# Patient Record
Sex: FEMALE | Race: WHITE | URBAN - METROPOLITAN AREA
[De-identification: names, ages, dates, MRNs, and addresses within clinical notes are randomized per-mention and may not be internally consistent; named-entity substitution may affect disease eponyms.]

---

## 2021-07-07 ENCOUNTER — TELEPHONE ENCOUNTER (OUTPATIENT)
Dept: URBAN - METROPOLITAN AREA SURGERY CENTER 30 | Facility: SURGERY CENTER | Age: 66
End: 2021-07-07

## 2021-08-09 ENCOUNTER — OFFICE VISIT (OUTPATIENT)
Dept: URBAN - METROPOLITAN AREA CLINIC 96 | Facility: CLINIC | Age: 66
End: 2021-08-09
Payer: MEDICARE

## 2021-08-09 ENCOUNTER — WEB ENCOUNTER (OUTPATIENT)
Dept: URBAN - METROPOLITAN AREA CLINIC 96 | Facility: CLINIC | Age: 66
End: 2021-08-09

## 2021-08-09 VITALS
HEART RATE: 63 BPM | SYSTOLIC BLOOD PRESSURE: 142 MMHG | BODY MASS INDEX: 24.83 KG/M2 | TEMPERATURE: 97.9 F | DIASTOLIC BLOOD PRESSURE: 92 MMHG | WEIGHT: 149 LBS | HEIGHT: 65 IN

## 2021-08-09 DIAGNOSIS — Z86.010 PERSONAL HISTORY OF COLONIC POLYPS: ICD-10-CM

## 2021-08-09 DIAGNOSIS — Z85.3 PERSONAL HISTORY OF BREAST CANCER: ICD-10-CM

## 2021-08-09 DIAGNOSIS — D12.6 TUBULAR ADENOMA OF COLON: ICD-10-CM

## 2021-08-09 DIAGNOSIS — Z85.42 PERSONAL HISTORY OF MALIGNANT NEOPLASM OF UTERUS: ICD-10-CM

## 2021-08-09 DIAGNOSIS — Z13.71 BRCA GENE MUTATION NEGATIVE IN FEMALE: ICD-10-CM

## 2021-08-09 DIAGNOSIS — K76.0 FATTY LIVER: ICD-10-CM

## 2021-08-09 DIAGNOSIS — K59.00 CONSTIPATION, UNSPECIFIED CONSTIPATION TYPE: ICD-10-CM

## 2021-08-09 DIAGNOSIS — K80.20 CALCULUS OF GALLBLADDER WITHOUT CHOLECYSTITIS WITHOUT OBSTRUCTION: ICD-10-CM

## 2021-08-09 DIAGNOSIS — K21.9 GASTROESOPHAGEAL REFLUX DISEASE, UNSPECIFIED WHETHER ESOPHAGITIS PRESENT: ICD-10-CM

## 2021-08-09 DIAGNOSIS — R14.0 BLOATING: ICD-10-CM

## 2021-08-09 PROBLEM — 444898006: Status: ACTIVE | Noted: 2021-08-09

## 2021-08-09 PROBLEM — 428283002: Status: ACTIVE | Noted: 2021-08-09

## 2021-08-09 PROBLEM — 70342003: Status: ACTIVE | Noted: 2021-08-09

## 2021-08-09 PROBLEM — 197321007: Status: ACTIVE | Noted: 2021-08-09

## 2021-08-09 PROBLEM — 428941002: Status: ACTIVE | Noted: 2021-08-09

## 2021-08-09 PROBLEM — 429087003: Status: ACTIVE | Noted: 2021-08-09

## 2021-08-09 PROBLEM — 14760008: Status: ACTIVE | Noted: 2021-08-09

## 2021-08-09 PROCEDURE — 99214 OFFICE O/P EST MOD 30 MIN: CPT | Performed by: INTERNAL MEDICINE

## 2021-08-09 RX ORDER — POLYETHYLENE GLYCOL 3350, SODIUM SULFATE, SODIUM CHLORIDE, POTASSIUM CHLORIDE, ASCORBIC ACID, SODIUM ASCORBATE 140-9-5.2G
AS DIRECTED KIT ORAL
Qty: 1 BOX | Refills: 1 | OUTPATIENT

## 2021-08-09 RX ORDER — DICYCLOMINE HYDROCHLORIDE 10 MG/1
TAKE 1 CAPSULE (10 MG) BY ORAL ROUTE 3 TIMES PER DAY AS NEEDED FOR ABDOMINAL DISCOMFORT AS NEEDED CAPSULE ORAL
Qty: 180 | Refills: 1 | Status: DISCONTINUED | COMMUNITY
Start: 2020-04-06

## 2021-08-09 NOTE — HPI-TODAY'S VISIT:
66 y.o. WF Make me 15 yrs younger Wants a full body transplant IBS is the same Was doing Dicyclomine 2-3 times / day - no difference on it vs off it Ran out of Pantoprazole - tried to live w/o it - but, started to get bad heartburn - w/ refilling it, heartburn fine On Letrozole - causing constipation Constipation is biggest issue Has occ bad bloating Has lost 10 pounds - now stalled - wants another 10 Bowels ok w/ Miralax and occ Colace Does a lot of dry fruit - yay to prunes

## 2021-10-01 ENCOUNTER — WEB ENCOUNTER (OUTPATIENT)
Dept: URBAN - METROPOLITAN AREA CLINIC 96 | Facility: CLINIC | Age: 66
End: 2021-10-01

## 2021-10-06 ENCOUNTER — OFFICE VISIT (OUTPATIENT)
Dept: URBAN - METROPOLITAN AREA SURGERY CENTER 18 | Facility: SURGERY CENTER | Age: 66
End: 2021-10-06
Payer: MEDICARE

## 2021-10-06 ENCOUNTER — CLAIMS CREATED FROM THE CLAIM WINDOW (OUTPATIENT)
Dept: URBAN - METROPOLITAN AREA CLINIC 4 | Facility: CLINIC | Age: 66
End: 2021-10-06
Payer: MEDICARE

## 2021-10-06 DIAGNOSIS — Z86.010 ADENOMAS PERSONAL HISTORY OF COLONIC POLYPS: ICD-10-CM

## 2021-10-06 DIAGNOSIS — D12.3 BENIGN NEOPLASM OF TRANSVERSE COLON: ICD-10-CM

## 2021-10-06 DIAGNOSIS — D12.3 ADENOMA OF TRANSVERSE COLON: ICD-10-CM

## 2021-10-06 PROCEDURE — 45380 COLONOSCOPY AND BIOPSY: CPT | Performed by: INTERNAL MEDICINE

## 2021-10-06 PROCEDURE — G8907 PT DOC NO EVENTS ON DISCHARG: HCPCS | Performed by: INTERNAL MEDICINE

## 2021-10-06 PROCEDURE — 88305 TISSUE EXAM BY PATHOLOGIST: CPT | Performed by: PATHOLOGY

## 2021-10-06 RX ORDER — POLYETHYLENE GLYCOL 3350, SODIUM SULFATE, SODIUM CHLORIDE, POTASSIUM CHLORIDE, ASCORBIC ACID, SODIUM ASCORBATE 140-9-5.2G
AS DIRECTED KIT ORAL
Qty: 1 BOX | Refills: 1 | Status: ACTIVE | COMMUNITY

## 2022-09-27 ENCOUNTER — WEB ENCOUNTER (OUTPATIENT)
Dept: URBAN - METROPOLITAN AREA CLINIC 96 | Facility: CLINIC | Age: 67
End: 2022-09-27

## 2022-10-27 ENCOUNTER — CLAIMS CREATED FROM THE CLAIM WINDOW (OUTPATIENT)
Dept: URBAN - METROPOLITAN AREA TELEHEALTH 2 | Facility: TELEHEALTH | Age: 67
End: 2022-10-27
Payer: MEDICARE

## 2022-10-27 ENCOUNTER — TELEPHONE ENCOUNTER (OUTPATIENT)
Dept: URBAN - METROPOLITAN AREA CLINIC 96 | Facility: CLINIC | Age: 67
End: 2022-10-27

## 2022-10-27 VITALS — HEIGHT: 65 IN

## 2022-10-27 DIAGNOSIS — K59.03 DRUG-INDUCED CONSTIPATION: ICD-10-CM

## 2022-10-27 DIAGNOSIS — K21.9 GASTROESOPHAGEAL REFLUX DISEASE, UNSPECIFIED WHETHER ESOPHAGITIS PRESENT: ICD-10-CM

## 2022-10-27 DIAGNOSIS — R14.0 ABDOMINAL BLOATING: ICD-10-CM

## 2022-10-27 PROCEDURE — 99213 OFFICE O/P EST LOW 20 MIN: CPT

## 2022-10-27 RX ORDER — POLYETHYLENE GLYCOL 3350, SODIUM SULFATE, SODIUM CHLORIDE, POTASSIUM CHLORIDE, ASCORBIC ACID, SODIUM ASCORBATE 140-9-5.2G
AS DIRECTED KIT ORAL
Qty: 1 BOX | Refills: 1 | Status: DISCONTINUED | COMMUNITY

## 2022-10-31 ENCOUNTER — WEB ENCOUNTER (OUTPATIENT)
Dept: URBAN - METROPOLITAN AREA CLINIC 96 | Facility: CLINIC | Age: 67
End: 2022-10-31

## 2022-12-20 ENCOUNTER — WEB ENCOUNTER (OUTPATIENT)
Dept: URBAN - METROPOLITAN AREA CLINIC 96 | Facility: CLINIC | Age: 67
End: 2022-12-20

## 2023-01-04 ENCOUNTER — WEB ENCOUNTER (OUTPATIENT)
Dept: URBAN - METROPOLITAN AREA CLINIC 96 | Facility: CLINIC | Age: 68
End: 2023-01-04

## 2023-01-04 ENCOUNTER — OFFICE VISIT (OUTPATIENT)
Dept: URBAN - METROPOLITAN AREA TELEHEALTH 2 | Facility: TELEHEALTH | Age: 68
End: 2023-01-04
Payer: MEDICARE

## 2023-01-04 ENCOUNTER — TELEPHONE ENCOUNTER (OUTPATIENT)
Dept: URBAN - METROPOLITAN AREA CLINIC 96 | Facility: CLINIC | Age: 68
End: 2023-01-04

## 2023-01-04 VITALS — HEIGHT: 65 IN

## 2023-01-04 DIAGNOSIS — K21.9 ACID REFLUX: ICD-10-CM

## 2023-01-04 DIAGNOSIS — R19.7 DIARRHEA, UNSPECIFIED TYPE: ICD-10-CM

## 2023-01-04 PROCEDURE — 99214 OFFICE O/P EST MOD 30 MIN: CPT

## 2023-01-04 RX ORDER — RIFAXIMIN 550 MG/1
1 TABLET TABLET ORAL THREE TIMES A DAY
Qty: 42 TABLET | Refills: 0 | OUTPATIENT
Start: 2023-01-04 | End: 2023-01-18

## 2023-01-04 NOTE — HPI-TODAY'S VISIT:
Patient is a 67-year-old female who presents via telehealth for annual visit regarding reflux.   Currently on pantoprazole 20 mg once a day. She  states she is currently asymptomatic with current use of PPI She ran out of medication a few months ago and started to have reflux symptoms Resumed and symptoms  Denies dysphagia or choking episodes Has had an EGD "years ago" which was normal to her memory. This occurred in [place]  She is taking letrozole which she feels caused constipation when she started taking almost a year and a half ago  She is currently having a BM every 2 days Can have a small volume stool once a day Associated abdominal bloating and gas  Takes Miralax and colace two capsules a day. Taking one cap of miralax daily  Denies BRBPR or melena Colonoscopy completed in 2021. Revealed 3mm polyp in the heaptic flexure. Diverticulosis throughout the entire colon. Pathology resutls revealed tubular adenoma. Recommended repeat in 5 years . Present: Patient states she continues to have abdominal bloating and gas despite FODMAP diet and monitoring what she eats  Previously commented on predominance of constpation. Continues to have alternating BMs- has diarrhea a "few times a week" Associated fecal urgency  She increased Miralax for a few days following last visit which improved constipation then developed diarrhea so decreased dose back down  continues to have "stomach churning" especially noted after a salad  Taking probiotic- digestive advantage  Denies BRBPR or melena  Denies vomiting  Denies heartburn or indigestion

## 2023-01-10 ENCOUNTER — TELEPHONE ENCOUNTER (OUTPATIENT)
Dept: URBAN - METROPOLITAN AREA CLINIC 96 | Facility: CLINIC | Age: 68
End: 2023-01-10

## 2023-01-10 RX ORDER — RIFAXIMIN 550 MG/1
1 TABLET TABLET ORAL THREE TIMES A DAY
Qty: 42 TABLET | Refills: 0 | Status: ACTIVE | COMMUNITY
Start: 2023-01-04 | End: 2023-01-18

## 2023-01-10 RX ORDER — METRONIDAZOLE 500 MG/1
1 TABLET TABLET ORAL THREE TIMES A DAY
Qty: 21 TABLET | Refills: 0 | OUTPATIENT
Start: 2023-01-10 | End: 2023-01-17

## 2023-01-11 ENCOUNTER — WEB ENCOUNTER (OUTPATIENT)
Dept: URBAN - METROPOLITAN AREA CLINIC 96 | Facility: CLINIC | Age: 68
End: 2023-01-11

## 2023-05-08 ENCOUNTER — WEB ENCOUNTER (OUTPATIENT)
Dept: URBAN - METROPOLITAN AREA CLINIC 96 | Facility: CLINIC | Age: 68
End: 2023-05-08

## 2023-05-23 ENCOUNTER — APPOINTMENT (RX ONLY)
Dept: URBAN - METROPOLITAN AREA OTHER 5 | Facility: OTHER | Age: 68
Setting detail: DERMATOLOGY
End: 2023-05-23

## 2023-05-23 DIAGNOSIS — R21 RASH AND OTHER NONSPECIFIC SKIN ERUPTION: ICD-10-CM | Status: INADEQUATELY CONTROLLED

## 2023-05-23 PROCEDURE — ? TREATMENT REGIMEN

## 2023-05-23 PROCEDURE — ? PRESCRIPTION

## 2023-05-23 PROCEDURE — ? COUNSELING

## 2023-05-23 PROCEDURE — ? PRESCRIPTION MEDICATION MANAGEMENT

## 2023-05-23 PROCEDURE — 99202 OFFICE O/P NEW SF 15 MIN: CPT

## 2023-05-23 RX ORDER — MONTELUKAST SODIUM 10 MG/1
TABLET, FILM COATED ORAL
Qty: 30 | Refills: 0 | Status: ERX | COMMUNITY
Start: 2023-05-23

## 2023-05-23 RX ADMIN — MONTELUKAST SODIUM: 10 TABLET, FILM COATED ORAL at 00:00

## 2023-05-23 ASSESSMENT — PAIN INTENSITY VAS: HOW INTENSE IS YOUR PAIN 0 BEING NO PAIN, 10 BEING THE MOST SEVERE PAIN POSSIBLE?: NO PAIN

## 2023-05-23 ASSESSMENT — SEVERITY ASSESSMENT: SEVERITY: MILD

## 2023-05-23 ASSESSMENT — LOCATION ZONE DERM
LOCATION ZONE: ARM
LOCATION ZONE: TRUNK
LOCATION ZONE: LEG

## 2023-05-23 ASSESSMENT — LOCATION DETAILED DESCRIPTION DERM
LOCATION DETAILED: LEFT ANTERIOR PROXIMAL UPPER ARM
LOCATION DETAILED: LEFT PROXIMAL PRETIBIAL REGION
LOCATION DETAILED: RIGHT LATERAL ABDOMEN

## 2023-05-23 ASSESSMENT — LOCATION SIMPLE DESCRIPTION DERM
LOCATION SIMPLE: LEFT PRETIBIAL REGION
LOCATION SIMPLE: ABDOMEN
LOCATION SIMPLE: LEFT UPPER ARM

## 2023-05-23 NOTE — PROCEDURE: PRESCRIPTION MEDICATION MANAGEMENT
Initiate Treatment: Singulair 10 mg tablet \\nSig: Take one tablet by mouth once a day for one month.
Detail Level: Zone
Render In Strict Bullet Format?: No

## 2023-05-23 NOTE — PROCEDURE: TREATMENT REGIMEN
Continue Regimen: Prednisone 4mg and Triamcinolone cream
Detail Level: Zone
Otc Regimen: Zyrtec 10mg twice a day instead of using it once a day.

## 2023-06-27 RX ORDER — MONTELUKAST SODIUM 10 MG/1
TABLET, FILM COATED ORAL
Qty: 30 | Refills: 1 | Status: ERX

## 2023-08-24 RX ORDER — MONTELUKAST SODIUM 10 MG/1
TABLET, FILM COATED ORAL
Qty: 30 | Refills: 5 | Status: ERX

## 2024-03-20 ENCOUNTER — TELEP (OUTPATIENT)
Dept: URBAN - METROPOLITAN AREA TELEHEALTH 2 | Facility: TELEHEALTH | Age: 69
End: 2024-03-20
Payer: MEDICARE

## 2024-03-20 DIAGNOSIS — R14.3 FLATULENCE: ICD-10-CM

## 2024-03-20 DIAGNOSIS — K21.9 GASTROESOPHAGEAL REFLUX DISEASE, UNSPECIFIED WHETHER ESOPHAGITIS PRESENT: ICD-10-CM

## 2024-03-20 DIAGNOSIS — R14.0 ABDOMINAL BLOATING: ICD-10-CM

## 2024-03-20 DIAGNOSIS — R19.7 DIARRHEA, UNSPECIFIED TYPE: ICD-10-CM

## 2024-03-20 PROBLEM — 249504006: Status: ACTIVE | Noted: 2024-03-20

## 2024-03-20 PROBLEM — 116289008: Status: ACTIVE | Noted: 2024-03-20

## 2024-03-20 PROBLEM — 62315008: Status: ACTIVE | Noted: 2024-03-20

## 2024-03-20 PROCEDURE — 99213 OFFICE O/P EST LOW 20 MIN: CPT

## 2024-03-20 NOTE — HPI-TODAY'S VISIT:
Last office visit January 2023 with LUIS Hooper. At that time she was complaining of abdominal bloating and gas despite FODMAP diet and monitoring what she eats, as well as alternating constipation and diarrhea with associated legal urgency. She was prescribed Xifaxin.  Taking pantoprazole daily. Denies dysphagia, odynophagia, nausea, vomiting, abdominal pain, reflux, and regurgitation. "Only issue" is gas and bloating. Fresh corn and peppermint makes it worse. Benefiber and Miralax daily. Taking Align. 4-5 really good BMs per week with smaller bowel movements in-between.  Intermittent urgency with loose stool. 4 prunes for breakfast with home-made rasin bran. Eats lots of fruits and vegetables. ------------------------------ // CT abdomen and pelvis with contrast, 3/7/2024: 0.7 cm benign-appearing lesion noted in the body of the pancreas, which radiologist believes is probably an IPMN.

## 2024-06-04 ENCOUNTER — OFFICE VISIT (OUTPATIENT)
Dept: URBAN - METROPOLITAN AREA TELEHEALTH 2 | Facility: TELEHEALTH | Age: 69
End: 2024-06-04
Payer: MEDICARE

## 2024-06-04 ENCOUNTER — TELEPHONE ENCOUNTER (OUTPATIENT)
Dept: URBAN - METROPOLITAN AREA CLINIC 96 | Facility: CLINIC | Age: 69
End: 2024-06-04

## 2024-06-04 DIAGNOSIS — R16.0 HEPATOMEGALY: ICD-10-CM

## 2024-06-04 DIAGNOSIS — K76.0 HEPATIC STEATOSIS: ICD-10-CM

## 2024-06-04 DIAGNOSIS — R19.7 DIARRHEA, UNSPECIFIED TYPE: ICD-10-CM

## 2024-06-04 DIAGNOSIS — K21.9 GASTROESOPHAGEAL REFLUX DISEASE, UNSPECIFIED WHETHER ESOPHAGITIS PRESENT: ICD-10-CM

## 2024-06-04 PROBLEM — 197321007: Status: ACTIVE | Noted: 2024-06-04

## 2024-06-04 PROCEDURE — 99213 OFFICE O/P EST LOW 20 MIN: CPT

## 2024-06-04 NOTE — HPI-TODAY'S VISIT:
69 year old female presents for follow-up. "About the same" as before.  Eats prunes every day but will increase the number of prunes if she feels constipated.  This usually leads to loose stool - for which she will eat rice to alleviate. Continue to complain about abdominal bloating and gassiness.  Stopping the probiotic did not make a difference. Notes abdominal bloating is worse when she is constipated. Drinks plenty of water throughout the day. She gained weight during the holidays - admits to poor dietary choices. Trying to cut out refined sugar.  She has lost a few pounds but has "a few more to go." . MRI A/P with and without contrast, 6/3/2024: Moderate diffuse hepatic steatosis.  Hepatomegaly.  No suspicious mass.  Cholelithiasis.  No biliary ductal dilation.  Pancreas cystic lesion in pancreatic body measuring approximately 5 mm with no overt aggressive features.  No pancreatic ductal dilation.  Normal signal intensity identified in the pancreas.  Colonic diverticulosis no evidence of diverticulitis or bowel obstruction. . CT abdomen and pelvis with contrast, 3/7/2024: 0.7 cm benign-appearing lesion noted in the body of the pancreas, which radiologist believes is probably an IPMN.

## 2024-06-04 NOTE — HPI-OTHER HISTORIES
Previously 3/2024 with Patricia Quinn PA:  Taking pantoprazole daily. Denies dysphagia, odynophagia, nausea, vomiting, abdominal pain, reflux, and regurgitation. "Only issue" is gas and bloating. Fresh corn and peppermint makes it worse. Benefiber and Miralax daily. Taking Align. 4-5 really good BMs per week with smaller bowel movements in-between.  Intermittent urgency with loose stool. 4 prunes for breakfast with home-made rasin bran. Eats lots of fruits and vegetables.  Previously 1/2023 with Debora Warner PA: At that time she was complaining of abdominal bloating and gas despite FODMAP diet and monitoring what she eats, as well as alternating constipation and diarrhea with associated legal urgency. She was prescribed Xifaxin.

## 2024-06-06 ENCOUNTER — DASHBOARD ENCOUNTERS (OUTPATIENT)
Age: 69
End: 2024-06-06

## 2024-06-06 ENCOUNTER — WEB ENCOUNTER (OUTPATIENT)
Dept: URBAN - METROPOLITAN AREA CLINIC 96 | Facility: CLINIC | Age: 69
End: 2024-06-06

## 2024-09-18 ENCOUNTER — OFFICE VISIT (OUTPATIENT)
Dept: URBAN - METROPOLITAN AREA TELEHEALTH 2 | Facility: TELEHEALTH | Age: 69
End: 2024-09-18
Payer: MEDICARE

## 2024-09-18 VITALS — BODY MASS INDEX: 25.33 KG/M2 | HEIGHT: 65 IN | WEIGHT: 152 LBS

## 2024-09-18 DIAGNOSIS — K21.9 GASTROESOPHAGEAL REFLUX DISEASE, UNSPECIFIED WHETHER ESOPHAGITIS PRESENT: ICD-10-CM

## 2024-09-18 DIAGNOSIS — R14.3 FLATULENCE: ICD-10-CM

## 2024-09-18 DIAGNOSIS — R19.7 DIARRHEA, UNSPECIFIED TYPE: ICD-10-CM

## 2024-09-18 DIAGNOSIS — R14.0 ABDOMINAL BLOATING: ICD-10-CM

## 2024-09-18 PROBLEM — 3855007: Status: ACTIVE | Noted: 2024-09-18

## 2024-09-18 PROBLEM — 80515008: Status: ACTIVE | Noted: 2024-09-18

## 2024-09-18 PROCEDURE — 99213 OFFICE O/P EST LOW 20 MIN: CPT

## 2024-09-18 NOTE — HPI-TODAY'S VISIT:
69 year old female presents for follow-up. "Status quo." "I don't feel sick." Lost a few pounds - to get ready for the upcoming [place] holidays. Reports known h/o of cholelithisiasis, hepatic steatosis, and hepatomegaly. "Wish I didn't feel bloated all the time." Stopped probiotics - didn't notice a difference. Benefiber and Miralax in the morning. "Still trying" to cut out refined sugar.  Hard during the holidays. Aims to have biggest meal of the day in the afternoon --> instead of dinner. PCP appointment next week.

## 2024-09-18 NOTE — HPI-OTHER HISTORIES
Previously 6/2024 with LUIS Swenson: 69 year old female presents for follow-up. "About the same" as before.  Eats prunes every day but will increase the number of prunes if she feels constipated.  This usually leads to loose stool - for which she will eat rice to alleviate. Continue to complain about abdominal bloating and gassiness.  Stopping the probiotic did not make a difference. Notes abdominal bloating is worse when she is constipated. Drinks plenty of water throughout the day. She gained weight during the holidays - admits to poor dietary choices. Trying to cut out refined sugar.  She has lost a few pounds but has "a few more to go." . MRI A/P with and without contrast, 6/3/2024: Moderate diffuse hepatic steatosis. Hepatomegaly. No suspicious mass.  Cholelithiasis. No biliary ductal dilation.  Pancreas cystic lesion in pancreatic body measuring approximately 5 mm with no overt aggressive features.  No pancreatic ductal dilation. Normal signal intensity identified in the pancreas.  Colonic diverticulosis no evidence of diverticulitis or bowel obstruction. . CT abdomen and pelvis with contrast, 3/7/2024: 0.7 cm benign-appearing lesion noted in the body of the pancreas, which radiologist believes is probably an IPMN. . Previously 3/2024 with LUIS Swenson:  Taking pantoprazole daily. Denies dysphagia, odynophagia, nausea, vomiting, abdominal pain, reflux, and regurgitation. "Only issue" is gas and bloating. Fresh corn and peppermint makes it worse. Benefiber and Miralax daily. Taking Align. 4-5 really good BMs per week with smaller bowel movements in-between.  Intermittent urgency with loose stool. 4 prunes for breakfast with home-made rasin bran. Eats lots of fruits and vegetables.  Previously 1/2023 with Debora SMITH: At that time she was complaining of abdominal bloating and gas despite FODMAP diet and monitoring what she eats, as well as alternating constipation and diarrhea with associated legal urgency. She was prescribed Xifaxin.

## 2024-12-09 ENCOUNTER — WEB ENCOUNTER (OUTPATIENT)
Dept: URBAN - METROPOLITAN AREA CLINIC 96 | Facility: CLINIC | Age: 69
End: 2024-12-09

## 2024-12-09 ENCOUNTER — TELEPHONE ENCOUNTER (OUTPATIENT)
Dept: URBAN - METROPOLITAN AREA CLINIC 96 | Facility: CLINIC | Age: 69
End: 2024-12-09

## 2025-02-27 NOTE — HPI-TODAY'S VISIT:
Caller: Sonu Bravo Jr.    Relationship: Self    Best call back number: [phone]     Requested Prescriptions:   Requested Prescriptions     Pending Prescriptions Disp Refills    apixaban (Eliquis) 5 MG tablet tablet 180 tablet 1     Sig: Take 1 tablet by mouth 2 (Two) Times a Day.    levothyroxine (SYNTHROID, LEVOTHROID) 125 MCG tablet 90 tablet 0     Sig: Take 1 tablet by mouth Every Morning.    rosuvastatin (CRESTOR) 20 MG tablet 90 tablet 3     Sig: Take 1 tablet by mouth Daily.        Pharmacy where request should be sent: [facility] - [facility] - [address] [facility] AT [address] TO [facility] SITES - [phone]  - [phone] FX     Last office visit with prescribing clinician: 1/23/2025   Last telemedicine visit with prescribing clinician: Visit date not found   Next office visit with prescribing clinician: Visit date not found     Additional details provided by patient:       Elan Gregory Rep   02/27/25 14:44 CST        Patient is a 67-year-old female who presents via telehealth for annual visit regarding reflux.   Currently on pantoprazole 20 mg once a day. She  states she is currently asymptomatic with current use of PPI She ran out of medication a few months ago and started to have reflux symptoms Resumed and symptoms  Denies dysphagia or choking episodes Has had an EGD "years ago" which was normal to her memory. This occurred in [place]  She is taking letrozole which she feels caused constipation when she started taking almost a year and a half ago  She is currently having a BM every 2 days Can have a small volume stool once a day Associated abdominal bloating and gas  Takes Miralax and colace two capsules a day. Taking one cap of miralax daily  Denies BRBPR or melena Colonoscopy completed in 2021. Revealed 3mm polyp in the heaptic flexure. Diverticulosis throughout the entire colon. Pathology resutls revealed tubular adenoma. Recommended repeat in 5 years

## 2025-08-19 ENCOUNTER — WEB ENCOUNTER (OUTPATIENT)
Dept: URBAN - METROPOLITAN AREA CLINIC 96 | Facility: CLINIC | Age: 70
End: 2025-08-19